# Patient Record
Sex: MALE | Race: WHITE | NOT HISPANIC OR LATINO
[De-identification: names, ages, dates, MRNs, and addresses within clinical notes are randomized per-mention and may not be internally consistent; named-entity substitution may affect disease eponyms.]

---

## 2022-06-14 ENCOUNTER — TRANSCRIPTION ENCOUNTER (OUTPATIENT)
Age: 77
End: 2022-06-14

## 2023-06-01 ENCOUNTER — APPOINTMENT (OUTPATIENT)
Dept: PODIATRY | Facility: CLINIC | Age: 78
End: 2023-06-01
Payer: MEDICARE

## 2023-06-01 DIAGNOSIS — B07.9 VIRAL WART, UNSPECIFIED: ICD-10-CM

## 2023-06-01 DIAGNOSIS — Z86.79 PERSONAL HISTORY OF OTHER DISEASES OF THE CIRCULATORY SYSTEM: ICD-10-CM

## 2023-06-01 DIAGNOSIS — Z86.39 PERSONAL HISTORY OF OTHER ENDOCRINE, NUTRITIONAL AND METABOLIC DISEASE: ICD-10-CM

## 2023-06-01 DIAGNOSIS — M77.51 OTHER ENTHESOPATHY OF RT FOOT AND ANKLE: ICD-10-CM

## 2023-06-01 DIAGNOSIS — Z87.891 PERSONAL HISTORY OF NICOTINE DEPENDENCE: ICD-10-CM

## 2023-06-01 DIAGNOSIS — Z86.69 PERSONAL HISTORY OF OTHER DISEASES OF THE NERVOUS SYSTEM AND SENSE ORGANS: ICD-10-CM

## 2023-06-01 DIAGNOSIS — L60.0 INGROWING NAIL: ICD-10-CM

## 2023-06-01 DIAGNOSIS — M77.31 CALCANEAL SPUR, RIGHT FOOT: ICD-10-CM

## 2023-06-01 PROBLEM — Z00.00 ENCOUNTER FOR PREVENTIVE HEALTH EXAMINATION: Status: ACTIVE | Noted: 2023-06-01

## 2023-06-01 PROCEDURE — 20550 NJX 1 TENDON SHEATH/LIGAMENT: CPT | Mod: RT

## 2023-06-01 PROCEDURE — 99213 OFFICE O/P EST LOW 20 MIN: CPT | Mod: 25

## 2023-06-01 PROCEDURE — 73620 X-RAY EXAM OF FOOT: CPT | Mod: RT

## 2023-06-01 PROCEDURE — 99203 OFFICE O/P NEW LOW 30 MIN: CPT | Mod: 25

## 2023-06-01 RX ORDER — METOPROLOL TARTRATE 25 MG/1
25 TABLET, FILM COATED ORAL
Refills: 0 | Status: ACTIVE | COMMUNITY

## 2023-06-01 RX ORDER — APIXABAN 5 MG/1
5 TABLET, FILM COATED ORAL
Refills: 0 | Status: ACTIVE | COMMUNITY

## 2023-06-01 RX ORDER — LEVOTHYROXINE SODIUM 175 UG/1
175 CAPSULE ORAL
Refills: 0 | Status: ACTIVE | COMMUNITY

## 2023-06-01 RX ORDER — LOSARTAN POTASSIUM 50 MG/1
50 TABLET, FILM COATED ORAL
Refills: 0 | Status: ACTIVE | COMMUNITY

## 2023-06-01 RX ORDER — ATORVASTATIN CALCIUM 40 MG/1
40 TABLET, FILM COATED ORAL
Refills: 0 | Status: ACTIVE | COMMUNITY

## 2023-06-05 PROBLEM — M77.31 CALCANEAL SPUR OF RIGHT FOOT: Status: ACTIVE | Noted: 2023-06-02

## 2023-06-05 PROBLEM — B07.9 WART: Status: ACTIVE | Noted: 2023-06-02

## 2023-06-05 PROBLEM — M77.51 BURSITIS OF RIGHT FOOT: Status: ACTIVE | Noted: 2023-06-02

## 2023-06-05 PROBLEM — L60.0 INGROWN NAIL: Status: ACTIVE | Noted: 2023-06-02

## 2023-06-05 NOTE — PROCEDURE
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 No sign of fracture.

## 2023-06-05 NOTE — HISTORY OF PRESENT ILLNESS
[FreeTextEntry1] : Patient presents today because of right side heel spur syndrome. It is painful 8/10. It has been going on for weeks and really bad for the last week. He had post-static dyskinesia and pain first step in the morning. He denies trauma. He has a right tibial ingrown nail and right plantar wart.

## 2023-06-05 NOTE — ASSESSMENT
[FreeTextEntry1] : \par Impression: Heel spur syndrome. Inferior calcaneal bursitis. Plantar fasciitis, right foot. Wart at the right hallux. Ingrown nail. Pain.\par \par Treatment: I performed a wedge resection at the ingrown nail. I very superficially debrided the wart and touched it with Silver Nitrate. Regarding the heel spur, he consented. I prepped the area with alcohol and I used Ethyl Chloride and injected 1 1/2cc's of a combination of Xylocaine and Kenalog-40. I gave him a series of stretching exercises to work on. I discussed change in shoe gear. He will follow-up in the office with continued pain that persists.

## 2023-06-05 NOTE — PHYSICAL EXAM
[1+] : right foot dorsalis pedis 1+ [2+] : left foot dorsalis pedis 2+ [Vibration Dec.] : diminished vibratory sensation at the level of the toes [Position Sense Dec.] : diminished position sense at the level of the toes [Diminished Throughout Right Foot] : diminished sensation with monofilament testing throughout right foot [Diminished Throughout Left Foot] : diminished sensation with monofilament testing throughout left foot [de-identified] : Equinus. Tight posterior muscle group. He has minor insertional Achilles tendonitis but very painful medial band insertional plantar fasciitis with inferior calcaneal bursitis and pain. [FreeTextEntry1] : Right small 3mm wart at the plantar hallux. Right hallux tibial incurvated nail, noninfected but painful.

## 2023-11-20 ENCOUNTER — APPOINTMENT (OUTPATIENT)
Dept: PODIATRY | Facility: CLINIC | Age: 78
End: 2023-11-20
Payer: MEDICARE

## 2023-11-20 DIAGNOSIS — M79.671 PAIN IN RIGHT FOOT: ICD-10-CM

## 2023-11-20 DIAGNOSIS — M72.2 PLANTAR FASCIAL FIBROMATOSIS: ICD-10-CM

## 2023-11-20 PROCEDURE — 99212 OFFICE O/P EST SF 10 MIN: CPT

## 2023-11-24 PROBLEM — M79.671 RIGHT FOOT PAIN: Status: ACTIVE | Noted: 2023-06-02

## 2023-11-29 PROBLEM — M72.2 PLANTAR FASCIITIS: Status: ACTIVE | Noted: 2023-11-24

## 2024-04-15 ENCOUNTER — APPOINTMENT (OUTPATIENT)
Dept: PODIATRY | Facility: CLINIC | Age: 79
End: 2024-04-15
Payer: MEDICARE

## 2024-04-15 DIAGNOSIS — B35.1 TINEA UNGUIUM: ICD-10-CM

## 2024-04-15 DIAGNOSIS — M79.674 PAIN IN RIGHT TOE(S): ICD-10-CM

## 2024-04-15 DIAGNOSIS — M79.675 PAIN IN RIGHT TOE(S): ICD-10-CM

## 2024-04-15 PROCEDURE — 11720 DEBRIDE NAIL 1-5: CPT

## 2024-04-17 NOTE — ASSESSMENT
[FreeTextEntry1] : Impression: Onychomycosis. Pain.  Treatment: I manually and mechanically debrided mycotic nails x3 using a small straight nail splitter and rotary ginger. The nails were aggressively debrided and debulked to make them comfortable in shoe gear. I applied Naftin gel and encouraged him to use OTC antifungals. Follow-up as needed.

## 2024-04-17 NOTE — PHYSICAL EXAM
[1+] : right foot dorsalis pedis 1+ [2+] : left foot dorsalis pedis 2+ [FreeTextEntry1] : Onychomycotic nails bilateral. They are painful and thick, especially 1 and 2 on the left and 2 on the right. They are yellow, thick, brittle with subungual debris and mycosis. Pain at the tibial border of the 2nd toe and tops and tips of 1. [Vibration Dec.] : diminished vibratory sensation at the level of the toes [Position Sense Dec.] : diminished position sense at the level of the toes [Diminished Throughout Right Foot] : diminished sensation with monofilament testing throughout right foot [Diminished Throughout Left Foot] : diminished sensation with monofilament testing throughout left foot

## 2024-04-17 NOTE — HISTORY OF PRESENT ILLNESS
[FreeTextEntry1] : Patient presents today because of painful deformed mycotic nails that he cannot care for himself.  They cause him pain and difficulty, especially in shoe gear.  The patient's history and physical has been reviewed and verified with no changes.

## 2024-04-19 PROBLEM — M79.674 PAIN IN TOES OF BOTH FEET: Status: ACTIVE | Noted: 2024-04-17

## 2024-04-19 PROBLEM — B35.1 ONYCHOMYCOSIS: Status: ACTIVE | Noted: 2024-04-17

## 2024-07-22 ENCOUNTER — APPOINTMENT (OUTPATIENT)
Dept: PODIATRY | Facility: CLINIC | Age: 79
End: 2024-07-22
Payer: MEDICARE

## 2024-07-22 DIAGNOSIS — M72.2 PLANTAR FASCIAL FIBROMATOSIS: ICD-10-CM

## 2024-07-22 DIAGNOSIS — M79.672 PAIN IN LEFT FOOT: ICD-10-CM

## 2024-07-22 DIAGNOSIS — M79.671 PAIN IN RIGHT FOOT: ICD-10-CM

## 2024-07-22 PROCEDURE — 99212 OFFICE O/P EST SF 10 MIN: CPT

## 2024-07-26 PROBLEM — M79.672 LEFT FOOT PAIN: Status: ACTIVE | Noted: 2024-07-24

## 2024-07-26 NOTE — HISTORY OF PRESENT ILLNESS
[FreeTextEntry1] : Patient presents today because of plantar fasciitis and heel spur syndrome, left side worse than right. It is going on for weeks. It is painful first step in the morning.  He has post-static dyskinesia, and he denies trauma.

## 2024-07-26 NOTE — PHYSICAL EXAM
[1+] : right foot dorsalis pedis 1+ [2+] : left foot dorsalis pedis 2+ [Vibration Dec.] : diminished vibratory sensation at the level of the toes [Position Sense Dec.] : diminished position sense at the level of the toes [Diminished Throughout Right Foot] : diminished sensation with monofilament testing throughout right foot [Diminished Throughout Left Foot] : diminished sensation with monofilament testing throughout left foot [de-identified] : Medial band insertional plantar fasciitis at the proximal one-third of the extent of the plantar fascia bilateral, left side worse than right. Pain is 5/10. The right side is 2/10. [FreeTextEntry1] : Dystrophic nails, bilateral, thickened.

## 2024-07-26 NOTE — PHYSICAL EXAM
[1+] : right foot dorsalis pedis 1+ [2+] : left foot dorsalis pedis 2+ [Vibration Dec.] : diminished vibratory sensation at the level of the toes [Position Sense Dec.] : diminished position sense at the level of the toes [Diminished Throughout Right Foot] : diminished sensation with monofilament testing throughout right foot [Diminished Throughout Left Foot] : diminished sensation with monofilament testing throughout left foot [de-identified] : Medial band insertional plantar fasciitis at the proximal one-third of the extent of the plantar fascia bilateral, left side worse than right. Pain is 5/10. The right side is 2/10. [FreeTextEntry1] : Dystrophic nails, bilateral, thickened.

## 2024-07-26 NOTE — ASSESSMENT
[FreeTextEntry1] : Impression: Plantar fasciitis. Pain, bilateral, left side worse than right.   Treatment: I gave him home therapy and stretching exercises. I discussed change in shoe gear. With continued pain that persists, we will give an injection.

## 2024-07-26 NOTE — PHYSICAL EXAM
[1+] : right foot dorsalis pedis 1+ [2+] : left foot dorsalis pedis 2+ [Vibration Dec.] : diminished vibratory sensation at the level of the toes [Position Sense Dec.] : diminished position sense at the level of the toes [Diminished Throughout Right Foot] : diminished sensation with monofilament testing throughout right foot [Diminished Throughout Left Foot] : diminished sensation with monofilament testing throughout left foot [de-identified] : Medial band insertional plantar fasciitis at the proximal one-third of the extent of the plantar fascia bilateral, left side worse than right. Pain is 5/10. The right side is 2/10. [FreeTextEntry1] : Dystrophic nails, bilateral, thickened.

## 2024-11-18 ENCOUNTER — APPOINTMENT (OUTPATIENT)
Dept: PODIATRY | Facility: CLINIC | Age: 79
End: 2024-11-18
Payer: MEDICARE

## 2024-11-18 DIAGNOSIS — M72.2 PLANTAR FASCIAL FIBROMATOSIS: ICD-10-CM

## 2024-11-18 PROCEDURE — 99212 OFFICE O/P EST SF 10 MIN: CPT

## 2025-02-10 ENCOUNTER — APPOINTMENT (OUTPATIENT)
Dept: PODIATRY | Facility: CLINIC | Age: 80
End: 2025-02-10

## 2025-02-10 DIAGNOSIS — L60.0 INGROWING NAIL: ICD-10-CM

## 2025-02-10 PROCEDURE — 99212 OFFICE O/P EST SF 10 MIN: CPT

## 2025-05-12 ENCOUNTER — APPOINTMENT (OUTPATIENT)
Dept: PODIATRY | Facility: CLINIC | Age: 80
End: 2025-05-12
Payer: MEDICARE

## 2025-05-12 DIAGNOSIS — M20.12 HALLUX VALGUS (ACQUIRED), LEFT FOOT: ICD-10-CM

## 2025-05-12 PROCEDURE — 99212 OFFICE O/P EST SF 10 MIN: CPT

## 2025-05-13 PROBLEM — M20.12 HALLUX VALGUS OF LEFT FOOT: Status: ACTIVE | Noted: 2025-05-13

## 2025-08-11 ENCOUNTER — APPOINTMENT (OUTPATIENT)
Dept: PODIATRY | Facility: CLINIC | Age: 80
End: 2025-08-11

## 2025-08-11 DIAGNOSIS — M77.40 METATARSALGIA, UNSPECIFIED FOOT: ICD-10-CM

## 2025-08-11 PROCEDURE — 99212 OFFICE O/P EST SF 10 MIN: CPT

## 2025-08-21 PROBLEM — M77.40 METATARSALGIA: Status: ACTIVE | Noted: 2025-08-21
